# Patient Record
Sex: FEMALE | Race: OTHER | HISPANIC OR LATINO | URBAN - METROPOLITAN AREA
[De-identification: names, ages, dates, MRNs, and addresses within clinical notes are randomized per-mention and may not be internally consistent; named-entity substitution may affect disease eponyms.]

---

## 2023-03-06 RX ORDER — CHLORHEXIDINE GLUCONATE 213 G/1000ML
1 SOLUTION TOPICAL DAILY
Refills: 0 | Status: DISCONTINUED | OUTPATIENT
Start: 2023-03-07 | End: 2023-03-07

## 2023-03-06 NOTE — ASU PATIENT PROFILE, ADULT - FALL HARM RISK - UNIVERSAL INTERVENTIONS
Bed in lowest position, wheels locked, appropriate side rails in place/Call bell, personal items and telephone in reach/Instruct patient to call for assistance before getting out of bed or chair/Non-slip footwear when patient is out of bed/[place] to call system/Physically safe environment - no spills, clutter or unnecessary equipment/Purposeful Proactive Rounding/Room/bathroom lighting operational, light cord in reach

## 2023-03-06 NOTE — ASU PATIENT PROFILE, ADULT - VISION (WITH CORRECTIVE LENSES IF THE PATIENT USUALLY WEARS THEM):
contacts/Partially impaired: cannot see medication labels or newsprint, but can see obstacles in path, and the surrounding layout; can count fingers at arm's length contacts/Normal vision: sees adequately in most situations; can see medication labels, newsprint

## 2023-03-06 NOTE — ASU PATIENT PROFILE, ADULT - NS PREOP UNDERSTANDS INFO
no solids after 12mn, clears allowed up to 930 am,  bring ID and insurance card, no valuables  or  jewelry, wear comfortable clothing, no alcohol today/yes

## 2023-03-07 ENCOUNTER — OUTPATIENT (OUTPATIENT)
Dept: OUTPATIENT SERVICES | Facility: HOSPITAL | Age: 38
LOS: 1 days | Discharge: HOME | End: 2023-03-07
Payer: MEDICAID

## 2023-03-07 VITALS
DIASTOLIC BLOOD PRESSURE: 55 MMHG | TEMPERATURE: 99 F | SYSTOLIC BLOOD PRESSURE: 107 MMHG | HEIGHT: 65 IN | OXYGEN SATURATION: 100 % | RESPIRATION RATE: 14 BRPM | HEART RATE: 53 BPM | WEIGHT: 198.42 LBS

## 2023-03-07 VITALS
DIASTOLIC BLOOD PRESSURE: 65 MMHG | OXYGEN SATURATION: 98 % | HEART RATE: 54 BPM | SYSTOLIC BLOOD PRESSURE: 108 MMHG | TEMPERATURE: 98 F | RESPIRATION RATE: 16 BRPM

## 2023-03-07 DIAGNOSIS — Z98.891 HISTORY OF UTERINE SCAR FROM PREVIOUS SURGERY: Chronic | ICD-10-CM

## 2023-03-07 PROCEDURE — 88302 TISSUE EXAM BY PATHOLOGIST: CPT | Mod: 26

## 2023-03-07 DEVICE — ETHICON HERNIA SECURESTRAP 5MM SIZE 25: Type: IMPLANTABLE DEVICE | Site: ABDOMINAL | Status: FUNCTIONAL

## 2023-03-07 DEVICE — IMPLANTABLE DEVICE: Type: IMPLANTABLE DEVICE | Site: ABDOMINAL | Status: FUNCTIONAL

## 2023-03-07 RX ORDER — OXYCODONE HYDROCHLORIDE 5 MG/1
5 TABLET ORAL ONCE
Refills: 0 | Status: DISCONTINUED | OUTPATIENT
Start: 2023-03-07 | End: 2023-03-07

## 2023-03-07 RX ORDER — KETOROLAC TROMETHAMINE 30 MG/ML
30 SYRINGE (ML) INJECTION ONCE
Refills: 0 | Status: DISCONTINUED | OUTPATIENT
Start: 2023-03-07 | End: 2023-03-07

## 2023-03-07 RX ORDER — APREPITANT 80 MG/1
40 CAPSULE ORAL ONCE
Refills: 0 | Status: COMPLETED | OUTPATIENT
Start: 2023-03-07 | End: 2023-03-07

## 2023-03-07 RX ORDER — ACETAMINOPHEN 500 MG
1000 TABLET ORAL ONCE
Refills: 0 | Status: COMPLETED | OUTPATIENT
Start: 2023-03-07 | End: 2023-03-07

## 2023-03-07 RX ORDER — ONDANSETRON 8 MG/1
4 TABLET, FILM COATED ORAL ONCE
Refills: 0 | Status: DISCONTINUED | OUTPATIENT
Start: 2023-03-07 | End: 2023-03-07

## 2023-03-07 RX ORDER — SODIUM CHLORIDE 9 MG/ML
1000 INJECTION, SOLUTION INTRAVENOUS
Refills: 0 | Status: DISCONTINUED | OUTPATIENT
Start: 2023-03-07 | End: 2023-03-07

## 2023-03-07 RX ORDER — FENTANYL CITRATE 50 UG/ML
50 INJECTION INTRAVENOUS ONCE
Refills: 0 | Status: DISCONTINUED | OUTPATIENT
Start: 2023-03-07 | End: 2023-03-07

## 2023-03-07 RX ADMIN — Medication 1000 MILLIGRAM(S): at 13:30

## 2023-03-07 RX ADMIN — Medication 30 MILLIGRAM(S): at 21:53

## 2023-03-07 RX ADMIN — Medication 400 MILLIGRAM(S): at 21:06

## 2023-03-07 RX ADMIN — CHLORHEXIDINE GLUCONATE 1 APPLICATION(S): 213 SOLUTION TOPICAL at 13:15

## 2023-03-07 RX ADMIN — APREPITANT 40 MILLIGRAM(S): 80 CAPSULE ORAL at 13:30

## 2023-03-07 RX ADMIN — Medication 1000 MILLIGRAM(S): at 21:36

## 2023-03-07 RX ADMIN — Medication 30 MILLIGRAM(S): at 21:38

## 2023-03-07 NOTE — BRIEF OPERATIVE NOTE - NSICDXBRIEFPOSTOP_GEN_ALL_CORE_FT
POST-OP DIAGNOSIS:  Ventral hernia 07-Mar-2023 20:37:37  Ondina Rojas  Umbilical hernia 07-Mar-2023 20:37:57  Ondina Rojas

## 2023-03-07 NOTE — ASU DISCHARGE PLAN (ADULT/PEDIATRIC) - NS MD DC FALL RISK RISK
RN cannot approve Refill Request    RN can NOT refill this medication PCP messaged that patient is overdue for Office Visit. Last office visit: 1/8/2016 Stanislaw Luo MD Last Physical: 12/26/2017 Last MTM visit: Visit date not found Last visit same specialty: 6/7/2018 Sterling Drummond MD.  Next visit within 3 mo: Visit date not found  Next physical within 3 mo: Visit date not found      Rudi Strong, [facility] Connection Triage/Med Refill 11/24/2019    Requested Prescriptions   Pending Prescriptions Disp Refills     pantoprazole (PROTONIX) 40 MG tablet [Pharmacy Med Name: PANTOPRAZOLE 40MG TABLETS] 45 tablet 0     Sig: TAKE 1 TABLET(40 MG) BY MOUTH DAILY       GI Medications Refill Protocol Failed - 11/24/2019  3:32 AM        Failed - PCP or prescribing provider visit in last 12 or next 3 months.     Last office visit with prescriber/PCP: 1/8/2016 Stanislaw Luo MD OR same dept: Visit date not found OR same specialty: 6/7/2018 Sterling Drummond MD  Last physical: 12/26/2017 Last MTM visit: Visit date not found   Next visit within 3 mo: Visit date not found  Next physical within 3 mo: Visit date not found  Prescriber OR PCP: Stanislaw Luo MD  Last diagnosis associated with med order: 1. GERD (gastroesophageal reflux disease)  - pantoprazole (PROTONIX) 40 MG tablet [Pharmacy Med Name: PANTOPRAZOLE 40MG TABLETS]; TAKE 1 TABLET(40 MG) BY MOUTH DAILY  Dispense: 45 tablet; Refill: 0    If protocol passes may refill for 12 months if within 3 months of last provider visit (or a total of 15 months).                
For information on Fall & Injury Prevention, visit: [email or web address] OR  [email or web address] OR  https://www.cdc.gov/steadi/patient.html

## 2023-03-07 NOTE — ASU DISCHARGE PLAN (ADULT/PEDIATRIC) - CARE PROVIDER_API CALL
Edenilson Covarrubias)  Surgery  [address], [address]  [place], [place] [address]  Phone: [phone]  Fax: [phone]  Follow Up Time: 1 week

## 2023-03-07 NOTE — ASU DISCHARGE PLAN (ADULT/PEDIATRIC) - ASU DC SPECIAL INSTRUCTIONSFT
Please contact Dr. Covarrubias' office for a follow-up appointment date within the next 7-10 days.  You may take over the counter Tylenol (2 tablets) every 6-8 hours (do not exceed 8 tablets in 24 hours) in combination with Advil (2 tablets) every 4-6 hours (do not exceed 12 tablets in 24 hours).  Keep the abdominal binder in place as tolerated to support your repair and provide comfort.  Avoid heavy lifting and exercise for the next 4-6 weeks.  You may use ice packs and heat packs alternating 20 minutes at a time for abdominal pain/swelling.

## 2023-03-07 NOTE — ASU DISCHARGE PLAN (ADULT/PEDIATRIC) - DISCHARGE TO
Patient is here today for Genetic testing results.      Latex:  Patient denies allergy to latex.  Medications reviewed with patient.  Tobacco use verified.  Allergies verified.    Patient would like communication of their results via:     Cell Phone:   Telephone Information:   Mobile [phone]   Mobile [phone]     Okay to leave a message containing results? Yes     Patient's current [facility] status: Active.            Home

## 2023-03-07 NOTE — BRIEF OPERATIVE NOTE - OPERATION/FINDINGS
Patient placed supine, anesthetized with general anesthesia, and skin draped and prepped in usual sterile fashion.  The abdomen was entered with veress needle was inserted into Lan's point and insufflated.  The abdomen was entered using optiview trocar, inspected, followed by insertion to 2 additional trocars on the L abdomen.  A peritoneal flap was dissected with electrocautery and incisional infraumbilical ventral hernia was reduced.  The hernia sac was dissected and the remaining fat hernia contents dissected out.  An umbilical hernia was reduced.  A trocar was placed into the umbilical peritoneum followed by extraction of the hernia sac and fat contents for both hernias from the abdomen.  The mesh was prepared with six 2-0 Vicryl stay sutures prior to placement within the abdominal cavity suture side up.  The umbilicus fascia was primarily repaired with a 0-PDS suture using a suture passer followed by primary repair of the incisional ventral hernia closure with interrupted 2-0 PDS sutures using the suture passer.  Intra-abdominally the suture passer was used to pull the stay sutures of the mesh externally and secured with square knots. The suture was tacked down to ensure it was lying flat followed by desufflation of the abdomen and the umbilicus skin incision was closed with running 4-0 Monocryl sutures.  The trocar sites were closed with 4-0 Monocryl interrupted sutures and all incisions were secured with dermabond and an abdominal binder was placed. The patient tolerated the procedure well and was transported in stable condition to the PACU.

## 2023-03-28 LAB — SURGICAL PATHOLOGY STUDY: SIGNIFICANT CHANGE UP

## (undated) DEVICE — SUT PDS II 0 18" ENDOLOOP LIGATURE

## (undated) DEVICE — SUT MONOCRYL 4-0 27" PS-2 UNDYED

## (undated) DEVICE — DRSG SUPPORTER ADULT 3" WAISTBAND MED

## (undated) DEVICE — SUT VICRYL 2-0 27" SH

## (undated) DEVICE — SLV COMPRESSION KNEE MED

## (undated) DEVICE — TROCAR COVIDIEN VERSAONE FIXATION CANNULA 5MM

## (undated) DEVICE — SUT VICRYL 0 18" ENDOLOOP LIGATURE

## (undated) DEVICE — TROCAR COVIDIEN VERSAPORT BLADELESS OPTICAL 5MM STANDARD

## (undated) DEVICE — SUT VICRYL 2-0 27" UR-6

## (undated) DEVICE — POSITIONER FOAM EGG CRATE ULNAR 2PCS (PINK)

## (undated) DEVICE — ELCTR GROUNDING PAD ADULT COVIDIEN

## (undated) DEVICE — SUT VICRYL 0 27" UR-6

## (undated) DEVICE — TROCAR COVIDIEN BLUNT TIP HASSAN 12MM

## (undated) DEVICE — Device

## (undated) DEVICE — DRSG SUPPORTER ADULT 3" WAISTBAND LRG

## (undated) DEVICE — GLV 7 PROTEXIS (WHITE)

## (undated) DEVICE — PACK GENERAL LAPAROSCOPY

## (undated) DEVICE — SUPP ATHLETIC MALE XLG 44-55IN

## (undated) DEVICE — ELCTR BOVIE PENCIL HANDPIECE ROCKER SWITCH 15FT

## (undated) DEVICE — APPLICATOR ENDOSCOPIC FOR SUGIFLO

## (undated) DEVICE — GLV 6.5 PROTEXIS (WHITE)